# Patient Record
Sex: FEMALE | Race: WHITE
[De-identification: names, ages, dates, MRNs, and addresses within clinical notes are randomized per-mention and may not be internally consistent; named-entity substitution may affect disease eponyms.]

---

## 2022-04-23 ENCOUNTER — HOSPITAL ENCOUNTER (EMERGENCY)
Dept: HOSPITAL 54 - ER | Age: 52
Discharge: HOME | End: 2022-04-23
Payer: SELF-PAY

## 2022-04-23 VITALS — WEIGHT: 180 LBS | BODY MASS INDEX: 27.28 KG/M2 | HEIGHT: 68 IN

## 2022-04-23 VITALS — SYSTOLIC BLOOD PRESSURE: 160 MMHG | DIASTOLIC BLOOD PRESSURE: 90 MMHG

## 2022-04-23 DIAGNOSIS — Z76.0: ICD-10-CM

## 2022-04-23 DIAGNOSIS — G47.00: ICD-10-CM

## 2022-04-23 DIAGNOSIS — F41.9: Primary | ICD-10-CM

## 2022-04-23 DIAGNOSIS — F32.A: ICD-10-CM

## 2022-04-23 NOTE — NUR
ER BED 12 NAUSEA, DIZZINESS, SWEATING, NOT ABLE TO SLEEP x 2DAY. NO CHEST PAIN. 
NO SOB. NOTABLY ANXIOUS.

## 2022-05-20 ENCOUNTER — HOSPITAL ENCOUNTER (EMERGENCY)
Dept: HOSPITAL 54 - ER | Age: 52
Discharge: LEFT BEFORE DISCHARGE | End: 2022-05-20
Payer: SELF-PAY

## 2022-05-20 VITALS — SYSTOLIC BLOOD PRESSURE: 136 MMHG | DIASTOLIC BLOOD PRESSURE: 91 MMHG

## 2022-05-20 VITALS — WEIGHT: 190 LBS | BODY MASS INDEX: 28.79 KG/M2 | HEIGHT: 68 IN

## 2022-05-20 DIAGNOSIS — Z53.21: Primary | ICD-10-CM

## 2022-05-22 ENCOUNTER — HOSPITAL ENCOUNTER (EMERGENCY)
Dept: HOSPITAL 54 - ER | Age: 52
Discharge: HOME | End: 2022-05-22
Payer: SELF-PAY

## 2022-05-22 VITALS — DIASTOLIC BLOOD PRESSURE: 100 MMHG | SYSTOLIC BLOOD PRESSURE: 133 MMHG

## 2022-05-22 VITALS — BODY MASS INDEX: 28.79 KG/M2 | WEIGHT: 190 LBS | HEIGHT: 68 IN

## 2022-05-22 DIAGNOSIS — F41.9: ICD-10-CM

## 2022-05-22 DIAGNOSIS — F32.A: ICD-10-CM

## 2022-05-22 DIAGNOSIS — Z60.2: ICD-10-CM

## 2022-05-22 DIAGNOSIS — F13.20: Primary | ICD-10-CM

## 2022-05-22 SDOH — SOCIAL STABILITY - SOCIAL INSECURITY: PROBLEMS RELATED TO LIVING ALONE: Z60.2

## 2022-05-22 NOTE — NUR
TO ER BED 3, BIB SELF "I THINK I'M HAVING CONVULSION FOR NOT TAKING ATIVAN FOR 
3 DAYS" MED REFILL FOR ATIVAN, AAOX3, BREATHING EVEN AND NON LABORED, CONNECTED 
TO MONITOR, AWAITING MD F[place]

## 2022-05-27 ENCOUNTER — HOSPITAL ENCOUNTER (EMERGENCY)
Dept: HOSPITAL 54 - ER | Age: 52
LOS: 1 days | Discharge: HOME | End: 2022-05-28
Payer: SELF-PAY

## 2022-05-27 VITALS — HEIGHT: 68 IN | BODY MASS INDEX: 28.04 KG/M2 | WEIGHT: 185 LBS

## 2022-05-27 DIAGNOSIS — F32.A: ICD-10-CM

## 2022-05-27 DIAGNOSIS — F13.20: ICD-10-CM

## 2022-05-27 DIAGNOSIS — Z79.899: ICD-10-CM

## 2022-05-27 DIAGNOSIS — Z76.0: Primary | ICD-10-CM

## 2022-05-27 DIAGNOSIS — Z60.2: ICD-10-CM

## 2022-05-27 DIAGNOSIS — F41.9: ICD-10-CM

## 2022-05-27 PROCEDURE — 99282 EMERGENCY DEPT VISIT SF MDM: CPT

## 2022-05-27 SDOH — SOCIAL STABILITY - SOCIAL INSECURITY: PROBLEMS RELATED TO LIVING ALONE: Z60.2

## 2022-05-28 VITALS — SYSTOLIC BLOOD PRESSURE: 130 MMHG | DIASTOLIC BLOOD PRESSURE: 90 MMHG

## 2022-05-28 RX ADMIN — ONDANSETRON ONE MG: 4 TABLET, ORALLY DISINTEGRATING ORAL at 00:46

## 2022-07-31 ENCOUNTER — HOSPITAL ENCOUNTER (EMERGENCY)
Dept: HOSPITAL 54 - ER | Age: 52
Discharge: HOME | End: 2022-07-31
Payer: SELF-PAY

## 2022-07-31 VITALS — WEIGHT: 190 LBS | BODY MASS INDEX: 28.79 KG/M2 | HEIGHT: 68 IN

## 2022-07-31 VITALS — SYSTOLIC BLOOD PRESSURE: 125 MMHG | DIASTOLIC BLOOD PRESSURE: 92 MMHG

## 2022-07-31 DIAGNOSIS — Z79.899: ICD-10-CM

## 2022-07-31 DIAGNOSIS — F32.A: ICD-10-CM

## 2022-07-31 DIAGNOSIS — F41.0: ICD-10-CM

## 2022-07-31 DIAGNOSIS — R11.2: Primary | ICD-10-CM

## 2022-07-31 DIAGNOSIS — G47.00: ICD-10-CM

## 2022-07-31 DIAGNOSIS — Z60.2: ICD-10-CM

## 2022-07-31 PROCEDURE — 99283 EMERGENCY DEPT VISIT LOW MDM: CPT

## 2022-07-31 SDOH — SOCIAL STABILITY - SOCIAL INSECURITY: PROBLEMS RELATED TO LIVING ALONE: Z60.2

## 2022-08-30 ENCOUNTER — HOSPITAL ENCOUNTER (EMERGENCY)
Dept: HOSPITAL 12 - ER | Age: 52
Discharge: HOME | End: 2022-08-30
Payer: SELF-PAY

## 2022-08-30 VITALS — WEIGHT: 200 LBS | HEIGHT: 65 IN | BODY MASS INDEX: 33.32 KG/M2

## 2022-08-30 VITALS — DIASTOLIC BLOOD PRESSURE: 74 MMHG | SYSTOLIC BLOOD PRESSURE: 142 MMHG

## 2022-08-30 DIAGNOSIS — F43.21: ICD-10-CM

## 2022-08-30 DIAGNOSIS — F32.A: ICD-10-CM

## 2022-08-30 DIAGNOSIS — T42.6X1A: ICD-10-CM

## 2022-08-30 DIAGNOSIS — F41.9: ICD-10-CM

## 2022-08-30 DIAGNOSIS — F12.10: ICD-10-CM

## 2022-08-30 DIAGNOSIS — T42.4X1A: Primary | ICD-10-CM

## 2022-08-30 DIAGNOSIS — R00.0: ICD-10-CM
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
## 2022-08-30 LAB
ALP SERPL-CCNC: 99 U/L (ref 50–136)
ALT SERPL W/O P-5'-P-CCNC: 63 U/L (ref 14–59)
AMPHETAMINES UR QL SCN>1000 NG/ML: NEGATIVE
APAP SERPL-MCNC: < 2 UG/ML (ref 10–30)
APPEARANCE UR: CLEAR
AST SERPL-CCNC: 37 U/L (ref 15–37)
BILIRUB DIRECT SERPL-MCNC: 0.1 MG/DL (ref 0–0.2)
BILIRUB SERPL-MCNC: 0.7 MG/DL (ref 0.2–1)
BILIRUB UR QL STRIP: NEGATIVE
BUN SERPL-MCNC: 10 MG/DL (ref 7–18)
CHLORIDE SERPL-SCNC: 104 MMOL/L (ref 98–107)
CO2 SERPL-SCNC: 23 MMOL/L (ref 21–32)
COCAINE UR QL SCN: NEGATIVE
COLOR UR: YELLOW
CREAT SERPL-MCNC: 0.9 MG/DL (ref 0.6–1.3)
ETHANOL SERPL-MCNC: < 3 MG/DL (ref 0–0)
GLUCOSE SERPL-MCNC: 143 MG/DL (ref 74–106)
GLUCOSE UR STRIP-MCNC: NEGATIVE MG/DL
HCT VFR BLD AUTO: 41.8 % (ref 31.2–41.9)
HGB UR QL STRIP: NEGATIVE
KETONES UR STRIP-MCNC: (no result) MG/DL
LEUKOCYTE ESTERASE UR QL STRIP: NEGATIVE
MCH RBC QN AUTO: 30.9 UUG (ref 24.7–32.8)
MCV RBC AUTO: 89.6 FL (ref 75.5–95.3)
NITRITE UR QL STRIP: NEGATIVE
OPIATES UR QL SCN: NEGATIVE
PCP UR QL SCN>25 NG/ML: NEGATIVE
PH UR STRIP: 5.5 [PH] (ref 5–8)
PLATELET # BLD AUTO: 343 K/UL (ref 179–408)
POTASSIUM SERPL-SCNC: 3.5 MMOL/L (ref 3.5–5.1)
SP GR UR STRIP: 1.02 (ref 1–1.03)
THC UR QL SCN>50 NG/ML: POSITIVE
UROBILINOGEN UR STRIP-MCNC: 0.2 E.U./DL
WS STN SPEC: 8.2 G/DL (ref 6.4–8.2)

## 2022-08-30 PROCEDURE — G0480 DRUG TEST DEF 1-7 CLASSES: HCPCS

## 2022-08-30 PROCEDURE — 80307 DRUG TEST PRSMV CHEM ANLYZR: CPT

## 2022-08-30 PROCEDURE — 87426 SARSCOV CORONAVIRUS AG IA: CPT

## 2022-08-30 PROCEDURE — 99285 EMERGENCY DEPT VISIT HI MDM: CPT

## 2022-08-30 PROCEDURE — 80048 BASIC METABOLIC PNL TOTAL CA: CPT

## 2022-08-30 PROCEDURE — 80076 HEPATIC FUNCTION PANEL: CPT

## 2022-08-30 PROCEDURE — 36415 COLL VENOUS BLD VENIPUNCTURE: CPT

## 2022-08-30 PROCEDURE — 81003 URINALYSIS AUTO W/O SCOPE: CPT

## 2022-08-30 PROCEDURE — 80320 DRUG SCREEN QUANTALCOHOLS: CPT

## 2022-08-30 PROCEDURE — 85025 COMPLETE CBC W/AUTO DIFF WBC: CPT

## 2022-08-30 PROCEDURE — 84484 ASSAY OF TROPONIN QUANT: CPT

## 2022-08-30 PROCEDURE — 93005 ELECTROCARDIOGRAM TRACING: CPT

## 2022-08-30 PROCEDURE — 96372 THER/PROPH/DIAG INJ SC/IM: CPT

## 2022-08-30 PROCEDURE — 80299 QUANTITATIVE ASSAY DRUG: CPT

## 2022-08-30 PROCEDURE — A4663 DIALYSIS BLOOD PRESSURE CUFF: HCPCS

## 2022-08-30 NOTE — NUR
Social Work consult was requested for a patient in the emergency room for mental health and 
grief resources. Patient is a 52-year-old female. Patient appears alert and oriented X4. 
Patient presents with depressed mood and congruent affect. Patients primary contact is her 
boyfriend, Terry García ([phone]) and he lives with the patient at [address] [place] [address].  Patient states they have a good relationship. Patient states 
she is currently unemployed. Patient denied history of substance abuse. Patient states she 
has a history of depression and anxiety. Patient states she has been grieving the loss of 
her mother and her brother just contracted COVID. Patient denies current suicidal ideation. 
SW provided emotional support, validation and spoke about coping strategies. SW provided 
grief and bereavement resources for [facility] [phone], [place], [facility] [phone], [place] and [facility] [phone], [place]. NAZIA provided the patient with mental health resources for [facility] [address] [facility]. [facility] [address], [place], [address] [phone], [facility] [address] [facility], [address] [phone], and [facility] [address] [facility], [address] [phone]. NAZIA also provided 
the resource for [facility] [address] [place] [address] ([phone]). NAZIA placed the resources in the patients chart. Patient 
appeared appreciative of the resources. Patient states she is motivated to find a therapist 
when she is discharged. Patient states her discharge plan is to go home with her boyfriend, 
Terry ([phone]) to [address] [place] [address].

## 2022-08-30 NOTE — NUR
assumed care of pt, per report pt overdosed this evening, after recieving bad 
news about a relative. pt took an unknown quanitity of Ativan and Lamictal. pt 
denies SI sts empahtically, she just wanted to sleep. pt admits to hx of 
anxiety, denies suicidal intent. She lives with her boyfriend, denies alcohol 
and tobacco use. besides anxiety pt denies further hx, but has noticed a 45 
pound weight gain over the last year.

pt assited to bed, placed on monitoring equipment and allowed to rest in a 
therapuetic mileu.

## 2022-08-30 NOTE — NUR
pt remains on cradiac monitor, intermittent blood pressure and pulse ox, pt has 
had favourable response to anti emetic and is now resting in bed, lying in the 
left lateral position, pts airway is intact and patent with no signs of 
distress. pt reminded of call light, replaced within reach of pt. 

pt appears to be is emotional distress as she is inexplicably crying and is 
unable to stop. pts face and exposed body are covered in blankets and soiled 
linen has been replaced. 

pt on hold from [employer], 5150, DTS



Patient seen on her left side vomiting, airway was kept clear and intact. 
Helped her up to a comfortable position. Patient was helped cleaned up and 
given a new blanket after soiling previous one. Patient was also given the 
option to help her rinse her mouth. Was given an antiemetic as ordered. Patient 
was mumbling, some words are understandable. Turned the lights of per patient's 
request for sleep.

## 2022-08-30 NOTE — NUR
Contacted Poison Control and relayed that pt took Ativan 2mg tabs about 7-8 
tabs, and lamictal 200mg 4-5 tabs at 1800 yesterday. Pt has been cleared by 
psyche eval team and medically cleared by Doctor. Called roommate Terry at 
[phone] to p/u the patient. Provided a warm meal, non-slip socks and warm 
blankets.

## 2022-09-04 ENCOUNTER — HOSPITAL ENCOUNTER (EMERGENCY)
Dept: HOSPITAL 54 - ER | Age: 52
Discharge: HOME | End: 2022-09-04
Payer: SELF-PAY

## 2022-09-04 VITALS — WEIGHT: 190 LBS | HEIGHT: 66 IN | BODY MASS INDEX: 30.53 KG/M2

## 2022-09-04 VITALS — SYSTOLIC BLOOD PRESSURE: 136 MMHG | DIASTOLIC BLOOD PRESSURE: 77 MMHG

## 2022-09-04 DIAGNOSIS — F13.20: Primary | ICD-10-CM

## 2022-09-04 DIAGNOSIS — G47.00: ICD-10-CM

## 2022-09-04 DIAGNOSIS — Z60.2: ICD-10-CM

## 2022-09-04 DIAGNOSIS — F41.9: ICD-10-CM

## 2022-09-04 DIAGNOSIS — Z79.899: ICD-10-CM

## 2022-09-04 DIAGNOSIS — F32.A: ICD-10-CM

## 2022-09-04 SDOH — SOCIAL STABILITY - SOCIAL INSECURITY: PROBLEMS RELATED TO LIVING ALONE: Z60.2

## 2022-09-06 ENCOUNTER — HOSPITAL ENCOUNTER (EMERGENCY)
Dept: HOSPITAL 54 - ER | Age: 52
Discharge: HOME | End: 2022-09-06
Payer: SELF-PAY

## 2022-09-06 VITALS — SYSTOLIC BLOOD PRESSURE: 151 MMHG | DIASTOLIC BLOOD PRESSURE: 107 MMHG

## 2022-09-06 VITALS — HEIGHT: 68 IN | BODY MASS INDEX: 28.79 KG/M2 | WEIGHT: 190 LBS

## 2022-09-06 DIAGNOSIS — G47.00: ICD-10-CM

## 2022-09-06 DIAGNOSIS — Z76.0: ICD-10-CM

## 2022-09-06 DIAGNOSIS — Z79.899: ICD-10-CM

## 2022-09-06 DIAGNOSIS — Z60.2: ICD-10-CM

## 2022-09-06 DIAGNOSIS — F13.20: ICD-10-CM

## 2022-09-06 DIAGNOSIS — F32.A: ICD-10-CM

## 2022-09-06 DIAGNOSIS — F41.9: Primary | ICD-10-CM

## 2022-09-06 SDOH — SOCIAL STABILITY - SOCIAL INSECURITY: PROBLEMS RELATED TO LIVING ALONE: Z60.2

## 2022-09-06 NOTE — NUR
The patient bibs for lorazepam refill off for 2 days. Denies SI/HI. Denies 
having auditory/visual hallucinations. Will continue to monitor the patient.

## 2022-09-06 NOTE — NUR
Patient discharged to home in stable condition. Written and verbal after care 
instructions given. Patient verbalizes understanding of instruction.

-------------------------------------------------------------------------------

Addendum: 09/06/22 at 1201 by PATRICIA

-------------------------------------------------------------------------------

Pt refused to sign discharge form, provided d/c papers.

## 2022-09-11 ENCOUNTER — HOSPITAL ENCOUNTER (EMERGENCY)
Dept: HOSPITAL 12 - ER | Age: 52
Discharge: HOME | End: 2022-09-11
Payer: SELF-PAY

## 2022-09-11 VITALS — BODY MASS INDEX: 28.79 KG/M2 | HEIGHT: 68 IN | WEIGHT: 190 LBS

## 2022-09-11 VITALS — DIASTOLIC BLOOD PRESSURE: 95 MMHG | SYSTOLIC BLOOD PRESSURE: 151 MMHG

## 2022-09-11 DIAGNOSIS — Z79.899: ICD-10-CM

## 2022-09-11 DIAGNOSIS — F41.9: Primary | ICD-10-CM

## 2022-09-11 PROCEDURE — A4663 DIALYSIS BLOOD PRESSURE CUFF: HCPCS

## 2022-09-17 ENCOUNTER — HOSPITAL ENCOUNTER (EMERGENCY)
Dept: HOSPITAL 12 - ER | Age: 52
Discharge: HOME | End: 2022-09-17
Payer: SELF-PAY

## 2022-09-17 VITALS — WEIGHT: 190 LBS | BODY MASS INDEX: 28.79 KG/M2 | HEIGHT: 68 IN

## 2022-09-17 DIAGNOSIS — F41.9: Primary | ICD-10-CM

## 2022-09-17 PROCEDURE — A4663 DIALYSIS BLOOD PRESSURE CUFF: HCPCS

## 2022-09-17 NOTE — NUR
IN ED WITH REPORT OF ATIVAN WITHDRAWALS. MAINTAIN SAFE ENVIRONMENT, A,A AND O 
X4, VSS, TEARFUL REASSURANCE PROVIDED, SEEN BY MD AWAITING ORDERS.

## 2022-09-17 NOTE — NUR
DISCUSSED AND GIVE PT DISCHARGE INSTRUCTIONS AND F/U APPT WITH PCP AND RX SENT 
TO PT'S PHARMACY.VERBALIZED UNDERSTANDING.LEFT HOSP WITHOUT DISTRESS.